# Patient Record
Sex: FEMALE | Race: WHITE | URBAN - METROPOLITAN AREA
[De-identification: names, ages, dates, MRNs, and addresses within clinical notes are randomized per-mention and may not be internally consistent; named-entity substitution may affect disease eponyms.]

---

## 2020-09-23 ENCOUNTER — OFFICE VISIT (OUTPATIENT)
Dept: URBAN - METROPOLITAN AREA CLINIC 113 | Facility: CLINIC | Age: 30
End: 2020-09-23
Payer: COMMERCIAL

## 2020-09-23 VITALS
TEMPERATURE: 97.1 F | SYSTOLIC BLOOD PRESSURE: 128 MMHG | DIASTOLIC BLOOD PRESSURE: 67 MMHG | HEART RATE: 77 BPM | RESPIRATION RATE: 18 BRPM

## 2020-09-23 DIAGNOSIS — G62.9 PERIPHERAL NEUROPATHY: ICD-10-CM

## 2020-09-23 DIAGNOSIS — K70.30 ALCOHOLIC CIRRHOSIS: ICD-10-CM

## 2020-09-23 DIAGNOSIS — R18.8 ASCITES: ICD-10-CM

## 2020-09-23 DIAGNOSIS — B19.20 HEPATITIS C: ICD-10-CM

## 2020-09-23 PROCEDURE — G8427 DOCREV CUR MEDS BY ELIG CLIN: HCPCS | Performed by: NURSE PRACTITIONER

## 2020-09-23 PROCEDURE — G8420 CALC BMI NORM PARAMETERS: HCPCS | Performed by: NURSE PRACTITIONER

## 2020-09-23 PROCEDURE — 99204 OFFICE O/P NEW MOD 45 MIN: CPT | Performed by: NURSE PRACTITIONER

## 2020-09-23 RX ORDER — FUROSEMIDE 20 MG/1
1 TABLET TABLET ORAL ONCE A DAY
Status: ACTIVE | COMMUNITY

## 2020-09-23 RX ORDER — GABAPENTIN 100 MG/1
3 TABLETS CAPSULE ORAL
Status: ACTIVE | COMMUNITY

## 2020-09-23 RX ORDER — SPIRONOLACTONE 50 MG/1
1 TABLET TABLET, FILM COATED ORAL ONCE A DAY
Status: ACTIVE | COMMUNITY

## 2020-09-23 RX ORDER — HYDROCODONE BITARTRATE AND ACETAMINOPHEN 5; 325 MG/1; MG/1
1 TABLET AS NEEDED TABLET ORAL
Status: ACTIVE | COMMUNITY

## 2020-09-23 RX ORDER — LEVOFLOXACIN 750 MG/1
1 TABLET TABLET, FILM COATED ORAL ONCE A DAY
Status: ACTIVE | COMMUNITY

## 2020-09-23 NOTE — HPI-TODAY'S VISIT:
30-year-old female with history of hepatitis C and polysubstance abuse, presenting for evaluation of cirrhosis. She was recently hospitalized for acute alcoholic hepatitis and neuropathy, with work-up as outlined below.  She was not seen by GI, but encouraged outpatient evaluation. Last week, she experienced the onset of numbness and tingling in her hands, feet and legs, jaundice, and abdominal swelling, prompting an ED visit at [facility]; she was ultimately transferred to [facility]. She had a CT of the abdomen and pelvis with contrast on 9/18/2020 which showed cirrhotic morphology of the liver with hepatosplenomegaly, small to moderate abdominopelvic ascites and cholelithiasis without evidence of cholecystitis. Ultrasound-guided paracentesis on 9/18/2020 was notable for the removal of 4660cc of straw-colored fluid; pathology showed moderate white blood cells, but was otherwise unremarkable. Labs 9/21/2020 : H/H 11/33.6, .3, , WBC 10.1 , ALT 39, , T bili 1.5, CMP otherwise unremarkable. 9/18/2020 H/H 12.7/37.4,: .1, , WBC 15.  , ALT 48, , T bili 2.7, CMP otherwise unremarkable aside from glucose 142, sodium 133.  Nonreactive hepatitis A antibody IgM, hepatitis B core antibody IgM, hepatitis B surface antigen.  Reactive hepatitis C antibody with detectable viral load.  Negative HIV.  PT/INR 17/1.32.  CRP 3.59, ESR 28. She was discharged on furosemide, spironolactone, and gabapentin, encouraged close outpatient follow-up. She is compliant with medical therapy, with resolution of abdominal and lower extremity swelling.  She continues to experience sharp, burning sensations in her hands and feet despite gabapentin.  She denies abdominal pain, nausea or vomiting.  She has intermittent diarrhea at baseline, but denies blood per rectum.  No further jaundice, icterus, dark urine or confusion.  She was initially diagnosed with hepatitis C in 2017 while incarcerated.  She admits to IV drug use prior to this time, which has been avoided within the last 4 years.  Following her imprisonment, she began drinking alcohol, up to 1/2 gallon every 3 days.  She has not had any alcohol since Friday, 5 days ago.  She has never had a GI evaluation.  There is no family history of GI malignancy.  Her aunt accompanies her to the visit.

## 2020-09-29 ENCOUNTER — OFFICE VISIT (OUTPATIENT)
Dept: URBAN - METROPOLITAN AREA CLINIC 113 | Facility: CLINIC | Age: 30
End: 2020-09-29
Payer: COMMERCIAL

## 2020-09-29 DIAGNOSIS — G62.9 PERIPHERAL NEUROPATHY: ICD-10-CM

## 2020-09-29 DIAGNOSIS — R18.8 ASCITES: ICD-10-CM

## 2020-09-29 DIAGNOSIS — B19.20 HEPATITIS C: ICD-10-CM

## 2020-09-29 DIAGNOSIS — K70.30 ALCOHOLIC CIRRHOSIS: ICD-10-CM

## 2020-09-29 PROCEDURE — 99214 OFFICE O/P EST MOD 30 MIN: CPT | Performed by: INTERNAL MEDICINE

## 2020-09-29 RX ORDER — TRAMADOL HYDROCHLORIDE 50 MG/1
1 TABLET AS NEEDED TABLET, FILM COATED ORAL
Qty: 45 | Refills: 0 | OUTPATIENT
Start: 2020-09-29 | End: 2020-10-29

## 2020-09-29 RX ORDER — LEVOTHYROXINE SODIUM 0.05 MG/1
1 TABLET IN THE MORNING ON AN EMPTY STOMACH TABLET ORAL ONCE A DAY
Status: ACTIVE | COMMUNITY

## 2020-09-29 RX ORDER — FUROSEMIDE 20 MG/1
1 TABLET TABLET ORAL ONCE A DAY
Status: ACTIVE | COMMUNITY

## 2020-09-29 RX ORDER — SPIRONOLACTONE 50 MG/1
1 TABLET TABLET, FILM COATED ORAL ONCE A DAY
Status: ACTIVE | COMMUNITY

## 2020-09-29 RX ORDER — GABAPENTIN 300 MG/1
1 TABLET CAPSULE ORAL
Status: ACTIVE | COMMUNITY

## 2020-09-29 RX ORDER — LEVOFLOXACIN 750 MG/1
1 TABLET TABLET, FILM COATED ORAL ONCE A DAY
Status: DISCONTINUED | COMMUNITY

## 2020-09-29 RX ORDER — HYDROCODONE BITARTRATE AND ACETAMINOPHEN 5; 325 MG/1; MG/1
1 TABLET AS NEEDED TABLET ORAL
Status: DISCONTINUED | COMMUNITY

## 2020-09-29 NOTE — PHYSICAL EXAM CONSTITUTIONAL:
pleasant, well nourished, well developed, in no acute distress , normal communication ability, chronically ill appearing and in a wheelchair

## 2020-09-29 NOTE — HPI-TODAY'S VISIT:
She is present with her aunt and sister at todays visit.  Her main complaint is neuropathic pain in her feet.  She is using Neurontin 3 tablets three times a day.  She is very weak and fatigued.  They are working on getting disability and Medicaid.   She is also trying to get a PCP.  Her ascites and edema are better.  She denies abdominal pain, jaundice, nausea or vomiting.  She is taking Aldactone 50mg and Lasix 20mg daily.

## 2020-09-29 NOTE — HPI-OTHER HISTORIES
30-year-old female with history of hepatitis C and polysubstance abuse, presenting for evaluation of cirrhosis. She was recently hospitalized for acute alcoholic hepatitis and neuropathy, with work-up as outlined below.  She was not seen by GI, but encouraged outpatient evaluation. Last week, she experienced the onset of numbness and tingling in her hands, feet and legs, jaundice, and abdominal swelling, prompting an ED visit at [facility]; she was ultimately transferred to [facility]. She had a CT of the abdomen and pelvis with contrast on 9/18/2020 which showed cirrhotic morphology of the liver with hepatosplenomegaly, small to moderate abdominopelvic ascites and cholelithiasis without evidence of cholecystitis. Ultrasound-guided paracentesis on 9/18/2020 was notable for the removal of 4660cc of straw-colored fluid; pathology showed moderate white blood cells, but was otherwise unremarkable. Labs 9/21/2020 : H/H 11/33.6, .3, , WBC 10.1 , ALT 39, , T bili 1.5, CMP otherwise unremarkable. 9/18/2020 H/H 12.7/37.4,: .1, , WBC 15.  , ALT 48, , T bili 2.7, CMP otherwise unremarkable aside from glucose 142, sodium 133.  Nonreactive hepatitis A antibody IgM, hepatitis B core antibody IgM, hepatitis B surface antigen.  Reactive hepatitis C antibody with detectable viral load.  Negative HIV.  PT/INR 17/1.32.  CRP 3.59, ESR 28. She was discharged on furosemide, spironolactone, and gabapentin, encouraged close outpatient follow-up. She is compliant with medical therapy, with resolution of abdominal and lower extremity swelling.  She continues to experience sharp, burning sensations in her hands and feet despite gabapentin.  She denies abdominal pain, nausea or vomiting.  She has intermittent diarrhea at baseline, but denies blood per rectum.  No further jaundice, icterus, dark urine or confusion.  She was initially diagnosed with hepatitis C in 2017 while incarcerated.  She admits to IV drug use prior to this time, which has been avoided within the last 4 years.  Following her imprisonment, she began drinking alcohol, up to 1/2 gallon every 3 days.  She has not had any alcohol since Friday, 5 days ago.  She has never had a GI evaluation.  There is no family history of GI malignancy.  Her aunt accompanies her to the visit.

## 2020-10-06 ENCOUNTER — TELEPHONE ENCOUNTER (OUTPATIENT)
Dept: URBAN - METROPOLITAN AREA CLINIC 113 | Facility: CLINIC | Age: 30
End: 2020-10-06

## 2020-10-08 ENCOUNTER — CLAIMS CREATED FROM THE CLAIM WINDOW (OUTPATIENT)
Dept: URBAN - METROPOLITAN AREA MEDICAL CENTER 19 | Facility: MEDICAL CENTER | Age: 30
End: 2020-10-08
Payer: COMMERCIAL

## 2020-10-08 DIAGNOSIS — R53.1 WEAKNESS: ICD-10-CM

## 2020-10-08 DIAGNOSIS — G99.0 AUTONOMIC NEUROPATHY IN DISEASES CLASSIFIED ELSEWHERE: ICD-10-CM

## 2020-10-08 DIAGNOSIS — K76.9 LIVER DISEASE, UNSPECIFIED: ICD-10-CM

## 2020-10-08 DIAGNOSIS — K70.30 ALCOHOLIC CIRRHOSIS OF LIVER WITHOUT ASCITES: ICD-10-CM

## 2020-10-08 PROCEDURE — 99254 IP/OBS CNSLTJ NEW/EST MOD 60: CPT | Performed by: INTERNAL MEDICINE

## 2020-10-16 ENCOUNTER — TELEPHONE ENCOUNTER (OUTPATIENT)
Dept: URBAN - METROPOLITAN AREA CLINIC 113 | Facility: CLINIC | Age: 30
End: 2020-10-16

## 2020-10-16 RX ORDER — FUROSEMIDE 20 MG/1
1 TABLET TABLET ORAL ONCE A DAY
Qty: 90 TABLET | Refills: 1

## 2020-10-16 RX ORDER — SPIRONOLACTONE 50 MG/1
1 TABLET TABLET, FILM COATED ORAL ONCE A DAY
Qty: 90 TABLET | Refills: 1

## 2020-10-19 ENCOUNTER — LAB OUTSIDE AN ENCOUNTER (OUTPATIENT)
Dept: URBAN - METROPOLITAN AREA CLINIC 113 | Facility: CLINIC | Age: 30
End: 2020-10-19

## 2020-10-26 ENCOUNTER — TELEPHONE ENCOUNTER (OUTPATIENT)
Dept: URBAN - METROPOLITAN AREA CLINIC 113 | Facility: CLINIC | Age: 30
End: 2020-10-26

## 2020-10-26 RX ORDER — GABAPENTIN 300 MG/1
1 TABLET CAPSULE ORAL
Qty: 90 TABLETS | Refills: 1

## 2020-11-24 ENCOUNTER — TELEPHONE ENCOUNTER (OUTPATIENT)
Dept: URBAN - METROPOLITAN AREA CLINIC 113 | Facility: CLINIC | Age: 30
End: 2020-11-24

## 2020-11-24 ENCOUNTER — OFFICE VISIT (OUTPATIENT)
Dept: URBAN - METROPOLITAN AREA CLINIC 113 | Facility: CLINIC | Age: 30
End: 2020-11-24
Payer: COMMERCIAL

## 2020-11-24 ENCOUNTER — WEB ENCOUNTER (OUTPATIENT)
Dept: URBAN - METROPOLITAN AREA CLINIC 113 | Facility: CLINIC | Age: 30
End: 2020-11-24

## 2020-11-24 VITALS
TEMPERATURE: 98.6 F | BODY MASS INDEX: 21.97 KG/M2 | HEIGHT: 67 IN | DIASTOLIC BLOOD PRESSURE: 81 MMHG | WEIGHT: 140 LBS | SYSTOLIC BLOOD PRESSURE: 118 MMHG | HEART RATE: 89 BPM

## 2020-11-24 DIAGNOSIS — K70.30 ALCOHOLIC CIRRHOSIS OF LIVER WITHOUT ASCITES: ICD-10-CM

## 2020-11-24 DIAGNOSIS — B18.2 CHRONIC HEPATITIS C WITHOUT HEPATIC COMA: ICD-10-CM

## 2020-11-24 DIAGNOSIS — G99.0 AUTONOMIC NEUROPATHY IN DISEASES CLASSIFIED ELSEWHERE: ICD-10-CM

## 2020-11-24 DIAGNOSIS — R53.1 WEAKNESS: ICD-10-CM

## 2020-11-24 DIAGNOSIS — K76.9 LIVER DISEASE, UNSPECIFIED: ICD-10-CM

## 2020-11-24 PROCEDURE — G9902 PT SCRN TBCO AND ID AS USER: HCPCS | Performed by: INTERNAL MEDICINE

## 2020-11-24 PROCEDURE — G8420 CALC BMI NORM PARAMETERS: HCPCS | Performed by: INTERNAL MEDICINE

## 2020-11-24 PROCEDURE — G8427 DOCREV CUR MEDS BY ELIG CLIN: HCPCS | Performed by: INTERNAL MEDICINE

## 2020-11-24 PROCEDURE — 99214 OFFICE O/P EST MOD 30 MIN: CPT | Performed by: INTERNAL MEDICINE

## 2020-11-24 RX ORDER — FUROSEMIDE 20 MG/1
1 TABLET TABLET ORAL ONCE A DAY
Qty: 90 TABLET | Refills: 1 | Status: ACTIVE | COMMUNITY

## 2020-11-24 RX ORDER — FUROSEMIDE 20 MG/1
1 TABLET TABLET ORAL ONCE A DAY
OUTPATIENT

## 2020-11-24 RX ORDER — SPIRONOLACTONE 50 MG/1
1 TABLET TABLET, FILM COATED ORAL ONCE A DAY
Qty: 90 TABLET | Refills: 1 | Status: ACTIVE | COMMUNITY

## 2020-11-24 RX ORDER — GABAPENTIN 300 MG/1
1 TABLET CAPSULE ORAL
Qty: 90 TABLETS | Refills: 1 | Status: ACTIVE | COMMUNITY

## 2020-11-24 RX ORDER — LEVOTHYROXINE SODIUM 0.05 MG/1
1 TABLET IN THE MORNING ON AN EMPTY STOMACH TABLET ORAL ONCE A DAY
Status: ACTIVE | COMMUNITY

## 2020-11-24 RX ORDER — GABAPENTIN 300 MG/1
1 TABLET CAPSULE ORAL
OUTPATIENT

## 2020-11-24 RX ORDER — SPIRONOLACTONE 50 MG/1
1 TABLET TABLET, FILM COATED ORAL ONCE A DAY
OUTPATIENT

## 2020-11-24 NOTE — PHYSICAL EXAM HENT:
Head, normocephalic, atraumatic, Face, Face within normal limits, Ears, External ears within normal limits, Nose/Nasopharynx, External nose  normal appearance, nares patent, no nasal discharge, Mouth and Throat, Oral cavity appearance normal, Breath odor normal, Lips, Appearance normal murmur

## 2020-11-24 NOTE — PHYSICAL EXAM CONSTITUTIONAL:
well developed, well nourished , chronically ill appearing, in a wheelchair, in no acute distress , ambulating without difficulty , normal communication ability

## 2020-11-24 NOTE — HPI-OTHER HISTORIES
Ms. Zepeda is a 30-year-old female with history of hepatitis C and polysubstance abuse, presenting for follow up of alcohol related cirrhosis.  She was recently hospitalized at [facility] in early october mostly for severe neuropathic pain and weakness.  Her MELD score at the time was 18.  She  is now doing physical therapy and substance abuse rehab.  PT has helped with her leg pain and weakness.  She is on Gabapentin 600mg TID.   She recently tested positive for COVID, but was asymptomatic over Halloween.  Her sons grandmother exposed her.  She also used Suboxone during that time.  She has not had alcohol since August after her initial hospitalization.  She recently established care with Dr. Rosas and had labs recently.  She was not able to go to PT or rehab when she was quarantined.   SHe denies otherwise abdominal pain, nausea, vomiting, jaundice, ascites, edema, confusion, change in bowel habits, blood in the stool or weight loss.  She was initially diagnosed with hepatitis C in 2017 while incarcerated.  She admits to IV drug use prior to this time, which has been avoided within the last 4 years.  Following her imprisonment, she began drinking alcohol, up to 1/2 gallon every 3 days.    She had a CT of the abdomen and pelvis with contrast on 9/18/2020 which showed cirrhotic morphology of the liver with hepatosplenomegaly, small to moderate abdominopelvic ascites and cholelithiasis without evidence of cholecystitis.  Ultrasound-guided paracentesis on 9/18/2020 was notable for the removal of 4660cc of straw-colored fluid; pathology showed moderate white blood cells, but was otherwise unremarkable.  Labs 9/21/2020 : H/H 11/33.6, .3, , WBC 10.1 , ALT 39, , T bili 1.5, CMP otherwise unremarkable. 9/18/2020 H/H 12.7/37.4,: .1, , WBC 15.  , ALT 48, , T bili 2.7, CMP otherwise unremarkable aside from glucose 142, sodium 133.  Nonreactive hepatitis A antibody IgM, hepatitis B core antibody IgM, hepatitis B surface antigen.  Reactive hepatitis C antibody with detectable viral load.  Negative HIV.  PT/INR 17/1.32.  CRP 3.59, ESR 28

## 2020-11-24 NOTE — PHYSICAL EXAM SKIN:
few maculopapular rashes on both arms, no suspicious lesions , no areas of discoloration , no jaundice present , good turgor , no masses , no tenderness on palpation

## 2020-11-30 ENCOUNTER — TELEPHONE ENCOUNTER (OUTPATIENT)
Dept: URBAN - METROPOLITAN AREA CLINIC 113 | Facility: CLINIC | Age: 30
End: 2020-11-30

## 2020-11-30 RX ORDER — LEVOTHYROXINE SODIUM 0.05 MG/1: 1 TABLET IN THE MORNING ON AN EMPTY STOMACH TABLET ORAL ONCE A DAY

## 2020-12-07 ENCOUNTER — OFFICE VISIT (OUTPATIENT)
Dept: URBAN - METROPOLITAN AREA CLINIC 113 | Facility: CLINIC | Age: 30
End: 2020-12-07

## 2020-12-07 RX ORDER — GABAPENTIN 300 MG/1
1 TABLET CAPSULE ORAL
Status: ACTIVE | COMMUNITY

## 2020-12-07 RX ORDER — SPIRONOLACTONE 50 MG/1
1 TABLET TABLET, FILM COATED ORAL ONCE A DAY
Status: ACTIVE | COMMUNITY

## 2020-12-07 RX ORDER — LEVOTHYROXINE SODIUM 0.05 MG/1
1 TABLET IN THE MORNING ON AN EMPTY STOMACH TABLET ORAL ONCE A DAY
Status: ACTIVE | COMMUNITY

## 2020-12-07 RX ORDER — FUROSEMIDE 20 MG/1
1 TABLET TABLET ORAL ONCE A DAY
Status: ACTIVE | COMMUNITY

## 2021-02-02 ENCOUNTER — OFFICE VISIT (OUTPATIENT)
Dept: URBAN - METROPOLITAN AREA CLINIC 113 | Facility: CLINIC | Age: 31
End: 2021-02-02
Payer: COMMERCIAL

## 2021-02-02 VITALS
TEMPERATURE: 98.6 F | DIASTOLIC BLOOD PRESSURE: 71 MMHG | HEART RATE: 99 BPM | SYSTOLIC BLOOD PRESSURE: 129 MMHG | HEIGHT: 67 IN | WEIGHT: 122 LBS | BODY MASS INDEX: 19.15 KG/M2 | RESPIRATION RATE: 18 BRPM

## 2021-02-02 DIAGNOSIS — K70.30 ALCOHOLIC CIRRHOSIS OF LIVER WITHOUT ASCITES: ICD-10-CM

## 2021-02-02 DIAGNOSIS — K76.89 OTHER SPECIFIED DISEASES OF LIVER: ICD-10-CM

## 2021-02-02 DIAGNOSIS — G99.0 AUTONOMIC NEUROPATHY IN DISEASES CLASSIFIED ELSEWHERE: ICD-10-CM

## 2021-02-02 DIAGNOSIS — B18.2 CHRONIC HEPATITIS C WITHOUT HEPATIC COMA: ICD-10-CM

## 2021-02-02 PROBLEM — 13791008 WEAKNESS: Status: ACTIVE | Noted: 2020-11-24

## 2021-02-02 PROCEDURE — 99214 OFFICE O/P EST MOD 30 MIN: CPT | Performed by: INTERNAL MEDICINE

## 2021-02-02 RX ORDER — SPIRONOLACTONE 50 MG/1
1 TABLET TABLET, FILM COATED ORAL ONCE A DAY
OUTPATIENT

## 2021-02-02 RX ORDER — DIAPER,BRIEF,YOUTH,DISPOSABLE
1 TABLET EACH MISCELLANEOUS ONCE A DAY
Status: ACTIVE | COMMUNITY

## 2021-02-02 RX ORDER — SPIRONOLACTONE 50 MG/1
1 TABLET TABLET, FILM COATED ORAL ONCE A DAY
Status: ACTIVE | COMMUNITY

## 2021-02-02 RX ORDER — LEVOTHYROXINE SODIUM 0.05 MG/1
1 TABLET IN THE MORNING ON AN EMPTY STOMACH TABLET ORAL ONCE A DAY
Status: ACTIVE | COMMUNITY

## 2021-02-02 RX ORDER — FUROSEMIDE 20 MG/1
1 TABLET TABLET ORAL ONCE A DAY
OUTPATIENT

## 2021-02-02 RX ORDER — FUROSEMIDE 20 MG/1
1 TABLET TABLET ORAL ONCE A DAY
Status: ACTIVE | COMMUNITY

## 2021-02-02 RX ORDER — GABAPENTIN 300 MG/1
1 TABLET CAPSULE ORAL
Status: ACTIVE | COMMUNITY

## 2021-02-02 NOTE — HPI-OTHER HISTORIES
Ms. Zepeda is a 31-year-old female with history of hepatitis C genotype 1a treatment naive and polysubstance abuse presenting for follow up of alcohol related cirrhosis.  She was recently hospitalized at [facility] in early october mostly for severe neuropathic leg pain and weakness.  Her MELD score at the time was 18.  She was enrolled in physical therapy and substance abuse rehab.  PT has helped with her leg pain and weakness.  She is on Gabapentin 600mg TID.   She is no longer living with her aunt and she is no longer helping her financially because she stayed with some of her old friends recently.  She denies any further drug or alcohol use, but still is still smoking 1 ppd.  She did test positive for COVID around [facility], but was asymptomatic.  Her sons grandmother exposed her.  She also used Suboxone during that time.  She has not had alcohol since August after her initial hospitalization.  SHe denies otherwise abdominal pain, nausea, vomiting, jaundice, ascites, edema, confusion, change in bowel habits, blood in the stool or weight loss.  She is compliant with Aldactone 50mg and Lasix 20mg.  She last had labs in October and an u/s which are below.  Her leg pain is better and she is able to walk with a walker.  She does have some bruises on the bottom of her feet, but does not know how they got there.  They are not painful.    She was initially diagnosed with hepatitis C in 2017 while incarcerated.  She admits to IV drug use prior to this time, which has been avoided within the last 4 years.  Following her imprisonment, she began drinking alcohol, up to 1/2 gallon every 3 days.    She had a CT of the abdomen and pelvis with contrast on 9/18/2020 which showed cirrhotic morphology of the liver with hepatosplenomegaly, small to moderate abdominopelvic ascites and cholelithiasis without evidence of cholecystitis.  Ultrasound-guided paracentesis on 9/18/2020 was notable for the removal of 4660cc of straw-colored fluid; pathology showed moderate white blood cells, but was otherwise unremarkable. RUQ ultrasound 10/8/20 normal liver, cholelithiasis, distended gallbladder with borderline wall thickening, negative bello sign, CBD 7mm.  Labs Labs 10/6/20:  PT/INR 17.8/1.40; WBC 6.3, Hg 11.6, ,  ; BUN/CR 4/0.4, , ALT 42, ALKP 177, TBILI 3, ALB 4.2, Na; HCV VL 13,100 iu/ml, genotype 1a MELD Na 17 9/21/2020 : H/H 11/33.6, .3, , WBC 10.1 , ALT 39, , T bili 1.5, CMP otherwise unremarkable. 9/18/2020 H/H 12.7/37.4,: .1, , WBC 15.  , ALT 48, , T bili 2.7, CMP otherwise unremarkable aside from glucose 142, sodium 133.  Nonreactive hepatitis A antibody IgM, hepatitis B core antibody IgM, hepatitis B surface antigen.  Reactive hepatitis C antibody with detectable viral load.  Negative HIV.  PT/INR 17/1.32.  CRP 3.59, ESR 28

## 2021-02-24 ENCOUNTER — OFFICE VISIT (OUTPATIENT)
Dept: URBAN - METROPOLITAN AREA CLINIC 113 | Facility: CLINIC | Age: 31
End: 2021-02-24

## 2021-04-27 ENCOUNTER — ERX REFILL RESPONSE (OUTPATIENT)
Dept: URBAN - METROPOLITAN AREA CLINIC 113 | Facility: CLINIC | Age: 31
End: 2021-04-27

## 2021-04-27 RX ORDER — FUROSEMIDE 20 MG/1
1 TABLET TABLET ORAL ONCE A DAY
Qty: 90 | Refills: 1

## 2021-05-02 ENCOUNTER — LAB OUTSIDE AN ENCOUNTER (OUTPATIENT)
Dept: URBAN - METROPOLITAN AREA CLINIC 113 | Facility: CLINIC | Age: 31
End: 2021-05-02

## 2021-07-09 ENCOUNTER — OFFICE VISIT (OUTPATIENT)
Dept: URBAN - METROPOLITAN AREA CLINIC 113 | Facility: CLINIC | Age: 31
End: 2021-07-09

## 2021-08-06 LAB
A/G RATIO: 1.6
AFP, SERUM: 4.1
AFP-L3%, SERUM: (no result)
ALBUMIN: 4.8
ALKALINE PHOSPHATASE: 80
ALT (SGPT): 25
AST (SGOT): 27
BASO (ABSOLUTE): 0.1
BASOS: 1
BILIRUBIN, TOTAL: 0.7
BUN/CREATININE RATIO: 16
BUN: 10
CALCIUM: 10.2
CARBON DIOXIDE, TOTAL: 26
CHLORIDE: 99
CREATININE: 0.61
EGFR IF AFRICN AM: 142
EGFR IF NONAFRICN AM: 123
EOS (ABSOLUTE): 0.1
EOS: 2
GLOBULIN, TOTAL: 3
GLUCOSE: 91
HEMATOCRIT: 40.3
HEMATOLOGY COMMENTS:: (no result)
HEMOGLOBIN: 13.1
IMMATURE CELLS: (no result)
IMMATURE GRANS (ABS): 0
IMMATURE GRANULOCYTES: 0
INR: 1
LYMPHS (ABSOLUTE): 1.8
LYMPHS: 26
MCH: 30.8
MCHC: 32.5
MCV: 95
MONOCYTES(ABSOLUTE): 0.6
MONOCYTES: 9
NEUTROPHILS (ABSOLUTE): 4.4
NEUTROPHILS: 62
NRBC: (no result)
PLATELETS: 218
POTASSIUM: 3.9
PROTEIN, TOTAL: 7.8
PROTHROMBIN TIME: 10.9
RBC: 4.26
RDW: 12.4
SODIUM: 138
WBC: 7

## 2021-09-03 ENCOUNTER — OFFICE VISIT (OUTPATIENT)
Dept: URBAN - METROPOLITAN AREA CLINIC 113 | Facility: CLINIC | Age: 31
End: 2021-09-03

## 2021-09-03 RX ORDER — LEVOTHYROXINE SODIUM 0.05 MG/1
1 TABLET IN THE MORNING ON AN EMPTY STOMACH TABLET ORAL ONCE A DAY
Status: ACTIVE | COMMUNITY

## 2021-09-03 RX ORDER — FUROSEMIDE 20 MG/1
1 TABLET TABLET ORAL ONCE A DAY
Qty: 90 | Refills: 1 | Status: ACTIVE | COMMUNITY

## 2021-09-03 RX ORDER — DIAPER,BRIEF,YOUTH,DISPOSABLE
1 TABLET EACH MISCELLANEOUS ONCE A DAY
Status: ACTIVE | COMMUNITY

## 2021-09-03 RX ORDER — SPIRONOLACTONE 50 MG/1
1 TABLET TABLET, FILM COATED ORAL ONCE A DAY
Status: ACTIVE | COMMUNITY

## 2021-09-03 RX ORDER — GABAPENTIN 300 MG/1
1 TABLET CAPSULE ORAL
Status: ACTIVE | COMMUNITY

## 2021-09-03 NOTE — HPI-OTHER HISTORIES
Ms. Zepeda is a 31-year-old female with history of hepatitis C genotype 1a treatment naive and polysubstance abuse presenting for follow up of alcohol related cirrhosis.  She was recently hospitalized at [facility] in early october mostly for severe neuropathic leg pain and weakness.  Her MELD score at the time was 18.  She was enrolled in physical therapy and substance abuse rehab.  PT has helped with her leg pain and weakness.  She is on Gabapentin 600mg TID.   She is no longer living with her aunt and she is no longer helping her financially because she stayed with some of her old friends recently.  She denies any further drug or alcohol use, but still is still smoking 1 ppd.  She did test positive for COVID around [facility], but was asymptomatic.  Her sons grandmother exposed her.  She also used Suboxone during that time.  She has not had alcohol since August after her initial hospitalization.  SHe denies otherwise abdominal pain, nausea, vomiting, jaundice, ascites, edema, confusion, change in bowel habits, blood in the stool or weight loss.  She is compliant with Aldactone 50mg and Lasix 20mg.  She last had labs in October and an u/s which are below.  Her leg pain is better and she is able to walk with a walker.  She does have some bruises on the bottom of her feet, but does not know how they got there.  They are not painful.    She was initially diagnosed with hepatitis C in 2017 while incarcerated.  She admits to IV drug use prior to this time, which has been avoided within the last 4 years.  Following her imprisonment, she began drinking alcohol, up to 1/2 gallon every 3 days.    She had a CT of the abdomen and pelvis with contrast on 9/18/2020 which showed cirrhotic morphology of the liver with hepatosplenomegaly, small to moderate abdominopelvic ascites and cholelithiasis without evidence of cholecystitis.  Ultrasound-guided paracentesis on 9/18/2020 was notable for the removal of 4660cc of straw-colored fluid; pathology showed moderate white blood cells, but was otherwise unremarkable. RUQ ultrasound 10/8/20 normal liver, cholelithiasis, distended gallbladder with borderline wall thickening, negative bello sign, CBD 7mm.  Labs Labs 10/6/20:  PT/INR 17.8/1.40; WBC 6.3, Hg 11.6, ,  ; BUN/CR 4/0.4, , ALT 42, ALKP 177, TBILI 3, ALB 4.2, Na; HCV VL 13,100 iu/ml, genotype 1a MELD Na 17 9/21/2020 : H/H 11/33.6, .3, , WBC 10.1 , ALT 39, , T bili 1.5, CMP otherwise unremarkable. 9/18/2020 H/H 12.7/37.4,: .1, , WBC 15.  , ALT 48, , T bili 2.7, CMP otherwise unremarkable aside from glucose 142, sodium 133.  Nonreactive hepatitis A antibody IgM, hepatitis B core antibody IgM, hepatitis B surface antigen.  Reactive hepatitis C antibody with detectable viral load.  Negative HIV.  PT/INR 17/1.32.  CRP 3.59, ESR 28

## 2022-09-12 ENCOUNTER — OFFICE VISIT (OUTPATIENT)
Dept: URBAN - METROPOLITAN AREA CLINIC 107 | Facility: CLINIC | Age: 32
End: 2022-09-12
Payer: COMMERCIAL

## 2022-09-12 ENCOUNTER — WEB ENCOUNTER (OUTPATIENT)
Dept: URBAN - METROPOLITAN AREA CLINIC 107 | Facility: CLINIC | Age: 32
End: 2022-09-12

## 2022-09-12 VITALS
HEIGHT: 67 IN | WEIGHT: 138.6 LBS | TEMPERATURE: 98.6 F | HEART RATE: 81 BPM | DIASTOLIC BLOOD PRESSURE: 68 MMHG | BODY MASS INDEX: 21.75 KG/M2 | SYSTOLIC BLOOD PRESSURE: 118 MMHG

## 2022-09-12 DIAGNOSIS — K70.30 ALCOHOLIC CIRRHOSIS OF LIVER WITHOUT ASCITES: ICD-10-CM

## 2022-09-12 DIAGNOSIS — B19.20 HEPATITIS C: ICD-10-CM

## 2022-09-12 DIAGNOSIS — B18.2 CHRONIC HEPATITIS C WITHOUT HEPATIC COMA: ICD-10-CM

## 2022-09-12 DIAGNOSIS — G62.9 PERIPHERAL NEUROPATHY: ICD-10-CM

## 2022-09-12 PROBLEM — 420054005 ALCOHOLIC CIRRHOSIS: Status: ACTIVE | Noted: 2020-11-24

## 2022-09-12 PROBLEM — 389026000 ASCITES: Status: ACTIVE | Noted: 2020-09-23

## 2022-09-12 PROBLEM — 420054005 ALCOHOLIC CIRRHOSIS: Status: ACTIVE | Noted: 2020-09-23

## 2022-09-12 PROBLEM — 277879009 AUTONOMIC NEUROPATHY: Status: ACTIVE | Noted: 2020-11-24

## 2022-09-12 PROBLEM — 235856003 LIVER DISEASE: Status: ACTIVE | Noted: 2020-11-24

## 2022-09-12 PROBLEM — 50711007 HEPATITIS C: Status: ACTIVE | Noted: 2020-09-23

## 2022-09-12 PROBLEM — 302226006 PERIPHERAL NEUROPATHY: Status: ACTIVE | Noted: 2020-09-23

## 2022-09-12 PROBLEM — 128302006: Status: ACTIVE | Noted: 2020-11-24

## 2022-09-12 PROCEDURE — 76700 US EXAM ABDOM COMPLETE: CPT | Performed by: INTERNAL MEDICINE

## 2022-09-12 PROCEDURE — 99214 OFFICE O/P EST MOD 30 MIN: CPT | Performed by: INTERNAL MEDICINE

## 2022-09-12 RX ORDER — SPIRONOLACTONE 50 MG/1
1 TABLET TABLET, FILM COATED ORAL ONCE A DAY
Status: ACTIVE | COMMUNITY

## 2022-09-12 RX ORDER — FUROSEMIDE 20 MG/1
1 TABLET TABLET ORAL ONCE A DAY
Qty: 90 | Refills: 1 | Status: ACTIVE | COMMUNITY

## 2022-09-12 RX ORDER — FUROSEMIDE 20 MG/1
1 TABLET TABLET ORAL ONCE A DAY
OUTPATIENT

## 2022-09-12 RX ORDER — SPIRONOLACTONE 50 MG/1
1 TABLET TABLET, FILM COATED ORAL ONCE A DAY
OUTPATIENT

## 2022-09-12 RX ORDER — LEVOTHYROXINE SODIUM 0.05 MG/1
1 TABLET IN THE MORNING ON AN EMPTY STOMACH TABLET ORAL ONCE A DAY
Status: ACTIVE | COMMUNITY

## 2022-09-12 NOTE — HPI-OTHER HISTORIES
Ms. Zepeda is a 32-year-old female with history of hepatitis C genotype 1a and polysubstance abuse presenting for follow up of alcohol related cirrhosis.  She completed hepatitis C treatment through a [facility] in [place] in 2021 with Juanpablo.  She had follow-up hepatitis C viral load which was negative.  She is still on Aldactone 50 mg daily and Lasix 20 mg daily.  She appears euvolemic.  She denies ascites, edema, abdominal pain, nausea, vomiting or weight loss.  She has not had any alcohol or illicit drug use since 2020.  She had labs a few months ago and her meld score was 6.  She had an ultrasound at [facility] 4 to 5 months ago which was negative for cirrhosis, liver lesions and only revealed gallstones.  She was seen by a surgeon for the gallstones who recommended observation only.   She is scheduled for labs next week with her PCP.  She is no longer having any severe neuropathic leg pain.  She is no longer on gabapentin or opioids.  She is currently working at [employer] and lives with her boyfriend and 10-year-old son.  She had a CT of the abdomen and pelvis with contrast on 9/18/2020 which showed cirrhotic morphology of the liver with hepatosplenomegaly, small to moderate abdominopelvic ascites and cholelithiasis without evidence of cholecystitis. Ultrasound-guided paracentesis on 9/18/2020 was notable for the removal of 4660cc of straw-colored fluid; pathology showed moderate white blood cells, but was otherwise unremarkable. RUQ ultrasound 10/8/20 normal liver, cholelithiasis, distended gallbladder with borderline wall thickening, negative bello sign, CBD 7mm.  Labs July MELD 6  2/15/2022 BUN 10, creatinine 0.71, AST 27, ALT 25, alk phos 72, T bili 0.4, albumin 4.7, AFP 4.9; PT 10.7, INR 1; WBC 6.4, hemoglobin 13.8, MCV 92, platelets 231  10/6/20:  PT/INR 17.8/1.40; WBC 6.3, Hg 11.6, ,  ; BUN/CR 4/0.4, , ALT 42, ALKP 177, TBILI 3, ALB 4.2, Na; HCV VL 13,100 iu/ml, genotype 1a MELD Na 17 9/21/2020 : H/H 11/33.6, .3, , WBC 10.1 , ALT 39, , T bili 1.5, CMP otherwise unremarkable. 9/18/2020 H/H 12.7/37.4,: .1, , WBC 15.  , ALT 48, , T bili 2.7, CMP otherwise unremarkable aside from glucose 142, sodium 133.  Nonreactive hepatitis A antibody IgM, hepatitis B core antibody IgM, hepatitis B surface antigen.  Reactive hepatitis C antibody with detectable viral load.  Negative HIV.  PT/INR 17/1.32.  CRP 3.59, ESR 28

## 2023-03-22 ENCOUNTER — WEB ENCOUNTER (OUTPATIENT)
Dept: URBAN - METROPOLITAN AREA CLINIC 107 | Facility: CLINIC | Age: 33
End: 2023-03-22

## 2023-03-22 ENCOUNTER — OFFICE VISIT (OUTPATIENT)
Dept: URBAN - METROPOLITAN AREA CLINIC 107 | Facility: CLINIC | Age: 33
End: 2023-03-22
Payer: COMMERCIAL

## 2023-03-22 VITALS
HEART RATE: 78 BPM | HEIGHT: 67 IN | BODY MASS INDEX: 21.35 KG/M2 | TEMPERATURE: 98.2 F | DIASTOLIC BLOOD PRESSURE: 75 MMHG | WEIGHT: 136 LBS | SYSTOLIC BLOOD PRESSURE: 112 MMHG

## 2023-03-22 DIAGNOSIS — B19.20 HEPATITIS C: ICD-10-CM

## 2023-03-22 DIAGNOSIS — K70.30 ALCOHOLIC CIRRHOSIS OF LIVER WITHOUT ASCITES: ICD-10-CM

## 2023-03-22 DIAGNOSIS — G62.9 PERIPHERAL NEUROPATHY: ICD-10-CM

## 2023-03-22 DIAGNOSIS — K83.8 COMMON BILE DUCT DILATION: ICD-10-CM

## 2023-03-22 DIAGNOSIS — B18.2 CHRONIC HEPATITIS C WITHOUT HEPATIC COMA: ICD-10-CM

## 2023-03-22 DIAGNOSIS — K80.20 CHOLELITHIASIS: ICD-10-CM

## 2023-03-22 PROCEDURE — 99214 OFFICE O/P EST MOD 30 MIN: CPT | Performed by: INTERNAL MEDICINE

## 2023-03-22 RX ORDER — LEVOTHYROXINE SODIUM 0.05 MG/1
1 TABLET IN THE MORNING ON AN EMPTY STOMACH TABLET ORAL ONCE A DAY
Status: ACTIVE | COMMUNITY

## 2023-03-22 RX ORDER — SPIRONOLACTONE 50 MG/1
1 TABLET TABLET, FILM COATED ORAL ONCE A DAY
Status: ACTIVE | COMMUNITY

## 2023-12-06 ENCOUNTER — OFFICE VISIT (OUTPATIENT)
Dept: URBAN - METROPOLITAN AREA CLINIC 113 | Facility: CLINIC | Age: 33
End: 2023-12-06

## 2023-12-06 RX ORDER — SPIRONOLACTONE 50 MG/1
1 TABLET TABLET, FILM COATED ORAL ONCE A DAY
Status: ACTIVE | COMMUNITY

## 2023-12-06 RX ORDER — LEVOTHYROXINE SODIUM 0.05 MG/1
1 TABLET IN THE MORNING ON AN EMPTY STOMACH TABLET ORAL ONCE A DAY
Status: ACTIVE | COMMUNITY

## 2023-12-08 ENCOUNTER — OFFICE VISIT (OUTPATIENT)
Dept: URBAN - METROPOLITAN AREA CLINIC 113 | Facility: CLINIC | Age: 33
End: 2023-12-08

## 2024-01-03 ENCOUNTER — DASHBOARD ENCOUNTERS (OUTPATIENT)
Age: 34
End: 2024-01-03

## 2024-01-03 ENCOUNTER — TELEPHONE ENCOUNTER (OUTPATIENT)
Dept: URBAN - METROPOLITAN AREA CLINIC 113 | Facility: CLINIC | Age: 34
End: 2024-01-03

## 2024-01-03 ENCOUNTER — OFFICE VISIT (OUTPATIENT)
Dept: URBAN - METROPOLITAN AREA CLINIC 107 | Facility: CLINIC | Age: 34
End: 2024-01-03
Payer: COMMERCIAL

## 2024-01-03 VITALS
WEIGHT: 148 LBS | DIASTOLIC BLOOD PRESSURE: 69 MMHG | SYSTOLIC BLOOD PRESSURE: 134 MMHG | HEART RATE: 79 BPM | HEIGHT: 67 IN | TEMPERATURE: 97.5 F | BODY MASS INDEX: 23.23 KG/M2

## 2024-01-03 DIAGNOSIS — K80.20 CHOLELITHIASIS: ICD-10-CM

## 2024-01-03 DIAGNOSIS — K83.8 COMMON BILE DUCT DILATION: ICD-10-CM

## 2024-01-03 DIAGNOSIS — K70.30 ALCOHOLIC CIRRHOSIS OF LIVER WITHOUT ASCITES: ICD-10-CM

## 2024-01-03 PROCEDURE — 99213 OFFICE O/P EST LOW 20 MIN: CPT | Performed by: INTERNAL MEDICINE

## 2024-01-03 RX ORDER — SPIRONOLACTONE 50 MG/1
1 TABLET TABLET, FILM COATED ORAL ONCE A DAY
Status: ACTIVE | COMMUNITY

## 2024-01-03 RX ORDER — LEVOTHYROXINE SODIUM 0.05 MG/1
1 TABLET IN THE MORNING ON AN EMPTY STOMACH TABLET ORAL ONCE A DAY
Status: ACTIVE | COMMUNITY

## 2024-01-03 NOTE — HPI-OTHER HISTORIES
Ms. Zepeda is a 33-year-old female with history of hepatitis C genotype 1a with SVR and polysubstance abuse presenting for follow up of alcohol related cirrhosis.  She completed hepatitis C treatment through a [facility] in [place] in 2021 with Juanpablo.  She had follow-up hepatitis C viral load which was negative.  She is still on Aldactone 50 mg daily.  She appears euvolemic.  She denies ascites, edema, abdominal pain, nausea, vomiting or weight loss.  She has not had any alcohol or illicit drug use since 2020.  She does still vape nicotine, but no cigarette use.  She denies abdominal pain, but occasionally feels full after eating a large meal.  She did see a surgeon while hospitalized for gallstones.  They did not recommend surgery at the time.  Ultrasound with elastography 3/27/2023 revealed a normal appearing liver except for mild heterogeneous, no liver lesions, gallstones, elastography was 4.76 kPa which is within normal notes.Labs 10/30/2023 WBC 8.5, hemoglobin 13.3, MCV 93, platelet 300; BUN 11, Gran 0.71, AST 26, ALT 16, alk phos 50, T. bili 0.3, albumin 4.8, INR 1, PT 11.2  Previous ultrasound with elastography 9/20/2022 revealed mild enlargement of the left hepatic lobe, mildly heterogeneous echogenicity, dilated common bile duct measuring 13 mm increased from 8 mm, normal gallbladder, cholelithiasis without cholecystitis, elastography was 7.79 which correlates with F1 and mild fibrosis. She had a CT of the abdomen and pelvis with contrast on 9/18/2020 which showed cirrhotic morphology of the liver with hepatosplenomegaly, small to moderate abdominopelvic ascites and cholelithiasis without evidence of cholecystitis. Ultrasound-guided paracentesis on 9/18/2020 was notable for the removal of 4660cc of straw-colored fluid; pathology showed moderate white blood cells, but was otherwise unremarkable. RUQ ultrasound 10/8/20 normal liver, cholelithiasis, distended gallbladder with borderline wall thickening, negative bello sign, CBD 7mm.

## 2024-01-18 ENCOUNTER — OFFICE VISIT (OUTPATIENT)
Dept: URBAN - METROPOLITAN AREA CLINIC 107 | Facility: CLINIC | Age: 34
End: 2024-01-18

## 2024-03-12 ENCOUNTER — LAB (OUTPATIENT)
Dept: URBAN - METROPOLITAN AREA CLINIC 107 | Facility: CLINIC | Age: 34
End: 2024-03-12